# Patient Record
Sex: MALE | Race: WHITE | NOT HISPANIC OR LATINO | URBAN - METROPOLITAN AREA
[De-identification: names, ages, dates, MRNs, and addresses within clinical notes are randomized per-mention and may not be internally consistent; named-entity substitution may affect disease eponyms.]

---

## 2017-10-13 ENCOUNTER — APPOINTMENT (RX ONLY)
Dept: URBAN - METROPOLITAN AREA CLINIC 304 | Facility: CLINIC | Age: 57
Setting detail: DERMATOLOGY
End: 2017-10-13

## 2017-10-13 DIAGNOSIS — L30.9 DERMATITIS, UNSPECIFIED: ICD-10-CM

## 2017-10-13 DIAGNOSIS — I87.2 VENOUS INSUFFICIENCY (CHRONIC) (PERIPHERAL): ICD-10-CM

## 2017-10-13 PROBLEM — L40.0 PSORIASIS VULGARIS: Status: ACTIVE | Noted: 2017-10-13

## 2017-10-13 PROBLEM — L85.3 XEROSIS CUTIS: Status: ACTIVE | Noted: 2017-10-13

## 2017-10-13 PROBLEM — I83.12 VARICOSE VEINS OF LEFT LOWER EXTREMITY WITH INFLAMMATION: Status: ACTIVE | Noted: 2017-10-13

## 2017-10-13 PROBLEM — I10 ESSENTIAL (PRIMARY) HYPERTENSION: Status: ACTIVE | Noted: 2017-10-13

## 2017-10-13 PROBLEM — L55.1 SUNBURN OF SECOND DEGREE: Status: ACTIVE | Noted: 2017-10-13

## 2017-10-13 PROBLEM — L29.8 OTHER PRURITUS: Status: ACTIVE | Noted: 2017-10-13

## 2017-10-13 PROBLEM — I83.11 VARICOSE VEINS OF RIGHT LOWER EXTREMITY WITH INFLAMMATION: Status: ACTIVE | Noted: 2017-10-13

## 2017-10-13 PROCEDURE — 99202 OFFICE O/P NEW SF 15 MIN: CPT

## 2017-10-13 PROCEDURE — ? PRESCRIPTION

## 2017-10-13 PROCEDURE — ? COUNSELING

## 2017-10-13 RX ORDER — FLUOCINONIDE 0.5 MG/G
CREAM TOPICAL
Qty: 1 | Refills: 3 | Status: ERX | COMMUNITY
Start: 2017-10-13

## 2017-10-13 RX ADMIN — FLUOCINONIDE: 0.5 CREAM TOPICAL at 18:06

## 2017-10-13 ASSESSMENT — LOCATION DETAILED DESCRIPTION DERM
LOCATION DETAILED: LEFT DISTAL PRETIBIAL REGION
LOCATION DETAILED: LEFT PROXIMAL POSTERIOR THIGH
LOCATION DETAILED: LEFT ANTERIOR PROXIMAL THIGH
LOCATION DETAILED: LEFT BUTTOCK
LOCATION DETAILED: RIGHT DISTAL PRETIBIAL REGION

## 2017-10-13 ASSESSMENT — LOCATION ZONE DERM
LOCATION ZONE: TRUNK
LOCATION ZONE: LEG

## 2017-10-13 ASSESSMENT — LOCATION SIMPLE DESCRIPTION DERM
LOCATION SIMPLE: LEFT PRETIBIAL REGION
LOCATION SIMPLE: LEFT POSTERIOR THIGH
LOCATION SIMPLE: LEFT BUTTOCK
LOCATION SIMPLE: LEFT THIGH
LOCATION SIMPLE: RIGHT PRETIBIAL REGION

## 2017-10-13 NOTE — HPI: RASH
How Severe Is Your Rash?: moderate
Is This A New Presentation, Or A Follow-Up?: Rash
Additional History: Issue started after hip replacement surgery

## 2018-04-18 ENCOUNTER — APPOINTMENT (OUTPATIENT)
Dept: URBAN - METROPOLITAN AREA CLINIC 319 | Age: 58
Setting detail: DERMATOLOGY
End: 2018-04-18

## 2018-04-18 DIAGNOSIS — L20.89 OTHER ATOPIC DERMATITIS: ICD-10-CM

## 2018-04-18 DIAGNOSIS — L663 OTHER SPECIFIED DISEASES OF HAIR AND HAIR FOLLICLES: ICD-10-CM

## 2018-04-18 DIAGNOSIS — L738 OTHER SPECIFIED DISEASES OF HAIR AND HAIR FOLLICLES: ICD-10-CM

## 2018-04-18 PROBLEM — L20.84 INTRINSIC (ALLERGIC) ECZEMA: Status: ACTIVE | Noted: 2018-04-18

## 2018-04-18 PROBLEM — L02.425 FURUNCLE OF RIGHT LOWER LIMB: Status: ACTIVE | Noted: 2018-04-18

## 2018-04-18 PROBLEM — L02.426 FURUNCLE OF LEFT LOWER LIMB: Status: ACTIVE | Noted: 2018-04-18

## 2018-04-18 PROCEDURE — OTHER COUNSELING: OTHER

## 2018-04-18 PROCEDURE — 99213 OFFICE O/P EST LOW 20 MIN: CPT

## 2018-04-18 PROCEDURE — OTHER PRESCRIPTION: OTHER

## 2018-04-18 RX ORDER — HYDROXYZINE HYDROCHLORIDE 25 MG/1
TABLET, FILM COATED ORAL
Qty: 60 | Refills: 3 | Status: ERX | COMMUNITY
Start: 2018-04-18

## 2018-04-18 RX ORDER — TRIAMCINOLONE ACETONIDE 1 MG/G
CREAM TOPICAL BID
Qty: 1 | Refills: 2 | Status: ERX | COMMUNITY
Start: 2018-04-18

## 2018-04-18 RX ORDER — MINOCYCLINE HYDROCHLORIDE 50 MG/1
TABLET ORAL
Qty: 60 | Refills: 1 | Status: ERX | COMMUNITY
Start: 2018-04-18

## 2018-04-18 RX ORDER — CLOBETASOL PROPIONATE 0.5 MG/G
CREAM TOPICAL
Qty: 1 | Refills: 6 | Status: ERX

## 2018-04-18 ASSESSMENT — LOCATION DETAILED DESCRIPTION DERM
LOCATION DETAILED: RIGHT PROXIMAL PRETIBIAL REGION
LOCATION DETAILED: RIGHT DISTAL RADIAL DORSAL FOREARM
LOCATION DETAILED: LEFT MEDIAL UPPER BACK
LOCATION DETAILED: LEFT DISTAL ULNAR DORSAL FOREARM
LOCATION DETAILED: RIGHT DISTAL POSTERIOR THIGH
LOCATION DETAILED: LEFT DISTAL POSTERIOR THIGH
LOCATION DETAILED: LEFT PROXIMAL PRETIBIAL REGION

## 2018-04-18 ASSESSMENT — LOCATION ZONE DERM
LOCATION ZONE: LEG
LOCATION ZONE: TRUNK
LOCATION ZONE: ARM

## 2018-04-18 ASSESSMENT — LOCATION SIMPLE DESCRIPTION DERM
LOCATION SIMPLE: LEFT FOREARM
LOCATION SIMPLE: LEFT PRETIBIAL REGION
LOCATION SIMPLE: RIGHT FOREARM
LOCATION SIMPLE: RIGHT POSTERIOR THIGH
LOCATION SIMPLE: RIGHT PRETIBIAL REGION
LOCATION SIMPLE: LEFT UPPER BACK
LOCATION SIMPLE: LEFT POSTERIOR THIGH

## 2019-01-29 ENCOUNTER — APPOINTMENT (OUTPATIENT)
Dept: URBAN - METROPOLITAN AREA CLINIC 319 | Age: 59
Setting detail: DERMATOLOGY
End: 2019-01-29

## 2019-01-29 ENCOUNTER — RX ONLY (RX ONLY)
Age: 59
End: 2019-01-29

## 2019-01-29 DIAGNOSIS — L20.89 OTHER ATOPIC DERMATITIS: ICD-10-CM

## 2019-01-29 PROCEDURE — OTHER COUNSELING: OTHER

## 2019-01-29 PROCEDURE — 99213 OFFICE O/P EST LOW 20 MIN: CPT

## 2019-01-29 PROCEDURE — OTHER TREATMENT REGIMEN: OTHER

## 2019-01-29 RX ORDER — CLOBETASOL PROPIONATE 0.5 MG/G
CREAM TOPICAL
Qty: 1 | Refills: 6 | Status: ERX

## 2019-01-29 RX ORDER — TRIAMCINOLONE ACETONIDE 1 MG/G
CREAM TOPICAL BID
Qty: 1 | Refills: 2 | Status: ERX

## 2019-01-29 ASSESSMENT — LOCATION SIMPLE DESCRIPTION DERM
LOCATION SIMPLE: LOWER BACK
LOCATION SIMPLE: ABDOMEN
LOCATION SIMPLE: LEFT UPPER ARM
LOCATION SIMPLE: RIGHT ELBOW

## 2019-01-29 ASSESSMENT — LOCATION ZONE DERM
LOCATION ZONE: ARM
LOCATION ZONE: TRUNK

## 2019-01-29 ASSESSMENT — LOCATION DETAILED DESCRIPTION DERM
LOCATION DETAILED: LEFT ANTERIOR PROXIMAL UPPER ARM
LOCATION DETAILED: XIPHOID
LOCATION DETAILED: SUPERIOR LUMBAR SPINE
LOCATION DETAILED: RIGHT MEDIAL ANTECUBITAL SKIN

## 2019-01-29 NOTE — PROCEDURE: TREATMENT REGIMEN
Continue Regimen: Triamcinolone acetonide 0.1 % topical cream (Apply to irritated areas on body bid until clear) \\n\\nClobetasol 0.05 % topical cream (Apply to affected areas twice daily until clear)\\n\\nCerave moisturizing cream (apply twice daily)
Detail Level: Detailed
Plan: Once patient cleared he is to continue topicals for an additional week post clearing. he is to continue with cerave moisturizing cream for maintenance

## 2019-11-08 ENCOUNTER — APPOINTMENT (OUTPATIENT)
Dept: URBAN - METROPOLITAN AREA CLINIC 319 | Age: 59
Setting detail: DERMATOLOGY
End: 2019-11-08

## 2019-11-08 DIAGNOSIS — L30.9 DERMATITIS, UNSPECIFIED: ICD-10-CM

## 2019-11-08 DIAGNOSIS — L20.89 OTHER ATOPIC DERMATITIS: ICD-10-CM

## 2019-11-08 DIAGNOSIS — L29.8 OTHER PRURITUS: ICD-10-CM

## 2019-11-08 PROCEDURE — OTHER TREATMENT REGIMEN: OTHER

## 2019-11-08 PROCEDURE — OTHER PRESCRIPTION: OTHER

## 2019-11-08 PROCEDURE — OTHER COUNSELING: OTHER

## 2019-11-08 PROCEDURE — OTHER INJECTION: OTHER

## 2019-11-08 PROCEDURE — 96372 THER/PROPH/DIAG INJ SC/IM: CPT

## 2019-11-08 PROCEDURE — OTHER ADDITIONAL NOTES: OTHER

## 2019-11-08 PROCEDURE — 99214 OFFICE O/P EST MOD 30 MIN: CPT | Mod: 25

## 2019-11-08 RX ORDER — TRIAMCINOLONE ACETONIDE 1 MG/G
OINTMENT TOPICAL
Qty: 1 | Refills: 6 | Status: ERX | COMMUNITY
Start: 2019-11-08

## 2019-11-08 RX ORDER — FLUOCINONIDE 0.5 MG/ML
OINTMENT TOPICAL
Qty: 3 | Refills: 6 | Status: ERX | COMMUNITY
Start: 2019-11-08

## 2019-11-08 ASSESSMENT — BSA RASH: BSA RASH: 50

## 2019-11-08 ASSESSMENT — LOCATION DETAILED DESCRIPTION DERM
LOCATION DETAILED: RIGHT ANTERIOR DISTAL THIGH
LOCATION DETAILED: RIGHT INFERIOR LATERAL LOWER BACK
LOCATION DETAILED: RIGHT PROXIMAL PRETIBIAL REGION
LOCATION DETAILED: LEFT ANTERIOR DISTAL THIGH
LOCATION DETAILED: RIGHT PROXIMAL PRETIBIAL REGION
LOCATION DETAILED: LEFT DISTAL PRETIBIAL REGION
LOCATION DETAILED: LEFT PROXIMAL PRETIBIAL REGION
LOCATION DETAILED: LEFT PROXIMAL PRETIBIAL REGION

## 2019-11-08 ASSESSMENT — LOCATION SIMPLE DESCRIPTION DERM
LOCATION SIMPLE: RIGHT PRETIBIAL REGION
LOCATION SIMPLE: LEFT PRETIBIAL REGION
LOCATION SIMPLE: RIGHT LOWER BACK
LOCATION SIMPLE: LEFT PRETIBIAL REGION
LOCATION SIMPLE: RIGHT PRETIBIAL REGION
LOCATION SIMPLE: RIGHT THIGH
LOCATION SIMPLE: LEFT THIGH

## 2019-11-08 ASSESSMENT — LOCATION ZONE DERM
LOCATION ZONE: LEG
LOCATION ZONE: LEG
LOCATION ZONE: TRUNK

## 2019-11-08 ASSESSMENT — SEVERITY ASSESSMENT: SEVERITY: SEVERE

## 2019-11-08 ASSESSMENT — BSA ECZEMA: % BODY COVERED IN ECZEMA: 50

## 2019-11-08 NOTE — HPI: RASH
How Severe Is Your Rash?: mild
Is This A New Presentation, Or A Follow-Up?: Rash
Additional History: Patient states he is here for a rash after wound care

## 2019-11-08 NOTE — HPI: WOUND
Is The Wound New Or Recurrent?: new
How Severe Is It?: moderate
How Is Your Wound Healing?: not healing
Is This A New Presentation, Or A Follow-Up?: Wound
Additional History: Patient had wound cloture and stated they told him it was a staph infection

## 2019-11-08 NOTE — PROCEDURE: TREATMENT REGIMEN
Detail Level: Detailed
Plan: Apply fluocinonide twice daily and triamcinolone ointment twice daily alternating with fluocinonide.
Plan: See plan for atopic dermatitis

## 2019-11-08 NOTE — PROCEDURE: ADDITIONAL NOTES
Detail Level: Simple
Additional Notes: Investigator's Global Assessment: 4\\n\\nPatient reports that the constant pruritis is causing lack of sleep at night and subsequently decreased quality of life.

## 2019-11-13 ENCOUNTER — APPOINTMENT (OUTPATIENT)
Dept: URBAN - METROPOLITAN AREA CLINIC 319 | Age: 59
Setting detail: DERMATOLOGY
End: 2019-11-13

## 2019-11-13 DIAGNOSIS — L20.89 OTHER ATOPIC DERMATITIS: ICD-10-CM

## 2019-11-13 DIAGNOSIS — L30.9 DERMATITIS, UNSPECIFIED: ICD-10-CM

## 2019-11-13 DIAGNOSIS — L29.8 OTHER PRURITUS: ICD-10-CM

## 2019-11-13 PROCEDURE — OTHER ADDITIONAL NOTES: OTHER

## 2019-11-13 PROCEDURE — OTHER PRESCRIPTION: OTHER

## 2019-11-13 PROCEDURE — 99214 OFFICE O/P EST MOD 30 MIN: CPT

## 2019-11-13 PROCEDURE — OTHER TREATMENT REGIMEN: OTHER

## 2019-11-13 PROCEDURE — OTHER COUNSELING: OTHER

## 2019-11-13 RX ORDER — HYDROXYZINE HYDROCHLORIDE 10 MG/1
TABLET, FILM COATED ORAL
Qty: 60 | Refills: 2 | Status: ERX | COMMUNITY
Start: 2019-11-13

## 2019-11-13 ASSESSMENT — LOCATION SIMPLE DESCRIPTION DERM
LOCATION SIMPLE: RIGHT PRETIBIAL REGION
LOCATION SIMPLE: RIGHT PRETIBIAL REGION
LOCATION SIMPLE: LEFT PRETIBIAL REGION
LOCATION SIMPLE: LEFT PRETIBIAL REGION
LOCATION SIMPLE: LEFT THIGH
LOCATION SIMPLE: RIGHT THIGH

## 2019-11-13 ASSESSMENT — LOCATION DETAILED DESCRIPTION DERM
LOCATION DETAILED: RIGHT PROXIMAL PRETIBIAL REGION
LOCATION DETAILED: LEFT ANTERIOR DISTAL THIGH
LOCATION DETAILED: LEFT PROXIMAL PRETIBIAL REGION
LOCATION DETAILED: LEFT PROXIMAL PRETIBIAL REGION
LOCATION DETAILED: RIGHT PROXIMAL PRETIBIAL REGION
LOCATION DETAILED: RIGHT ANTERIOR DISTAL THIGH
LOCATION DETAILED: LEFT DISTAL PRETIBIAL REGION

## 2019-11-13 ASSESSMENT — LOCATION ZONE DERM
LOCATION ZONE: LEG
LOCATION ZONE: LEG

## 2019-11-13 ASSESSMENT — BSA ECZEMA: % BODY COVERED IN ECZEMA: 40

## 2019-11-13 ASSESSMENT — SEVERITY ASSESSMENT: SEVERITY: MODERATE

## 2019-11-13 NOTE — PROCEDURE: TREATMENT REGIMEN
Initiate Treatment: Clobetasol twice daily to arms, and legs.
Plan: See plan for atopic dermatitis
Otc Regimen: Cerave itch relief cream.
Plan: Apply fluocinonide twice daily and triamcinolone ointment twice daily alternating with fluocinonide.
Continue Regimen: Triamcinolone ointment twice daily alternating with fluocininide ointment.
Detail Level: Detailed

## 2019-12-03 ENCOUNTER — APPOINTMENT (OUTPATIENT)
Dept: URBAN - METROPOLITAN AREA CLINIC 319 | Age: 59
Setting detail: DERMATOLOGY
End: 2019-12-03

## 2019-12-03 DIAGNOSIS — L20.89 OTHER ATOPIC DERMATITIS: ICD-10-CM

## 2019-12-03 PROCEDURE — OTHER TREATMENT REGIMEN: OTHER

## 2019-12-03 PROCEDURE — OTHER COUNSELING: OTHER

## 2019-12-03 PROCEDURE — 99214 OFFICE O/P EST MOD 30 MIN: CPT

## 2019-12-03 ASSESSMENT — LOCATION DETAILED DESCRIPTION DERM
LOCATION DETAILED: LEFT PROXIMAL PRETIBIAL REGION
LOCATION DETAILED: RIGHT PROXIMAL PRETIBIAL REGION
LOCATION DETAILED: LEFT ANTERIOR DISTAL THIGH
LOCATION DETAILED: RIGHT ANTERIOR DISTAL THIGH

## 2019-12-03 ASSESSMENT — LOCATION SIMPLE DESCRIPTION DERM
LOCATION SIMPLE: RIGHT PRETIBIAL REGION
LOCATION SIMPLE: LEFT PRETIBIAL REGION
LOCATION SIMPLE: RIGHT THIGH
LOCATION SIMPLE: LEFT THIGH

## 2019-12-03 ASSESSMENT — LOCATION ZONE DERM: LOCATION ZONE: LEG

## 2019-12-03 ASSESSMENT — SEVERITY ASSESSMENT: SEVERITY: MILD

## 2019-12-03 NOTE — PROCEDURE: TREATMENT REGIMEN
Discontinue Regimen: Fluocinonide unless flaring
Plan: If patient is doing well one once daily for a month then we can decrease to three times a week then twice a week then once weekly then stop as long as there is no flare.
Otc Regimen: Cerave itch relief cream.
Continue Regimen: Triamcinolone ointment once daily\\nHydroxyzine as needed for itching\\nClobetasol twice daily to arms, and legs
Detail Level: Detailed

## 2022-07-26 ENCOUNTER — APPOINTMENT (OUTPATIENT)
Dept: URBAN - METROPOLITAN AREA CLINIC 319 | Age: 62
Setting detail: DERMATOLOGY
End: 2022-07-31

## 2022-07-26 DIAGNOSIS — L20.89 OTHER ATOPIC DERMATITIS: ICD-10-CM

## 2022-07-26 PROCEDURE — OTHER PRESCRIPTION: OTHER

## 2022-07-26 PROCEDURE — 96372 THER/PROPH/DIAG INJ SC/IM: CPT

## 2022-07-26 PROCEDURE — OTHER COUNSELING: OTHER

## 2022-07-26 PROCEDURE — OTHER PRESCRIPTION MEDICATION MANAGEMENT: OTHER

## 2022-07-26 PROCEDURE — OTHER INTRAMUSCULAR KENALOG: OTHER

## 2022-07-26 RX ORDER — CLOBETASOL PROPIONATE 0.5 MG/G
OINTMENT TOPICAL
Qty: 60 | Refills: 3 | Status: ERX | COMMUNITY
Start: 2022-07-26

## 2022-07-26 RX ORDER — DOXYCYCLINE HYCLATE 100 MG/1
CAPSULE, GELATIN COATED ORAL
Qty: 28 | Refills: 0 | Status: ERX | COMMUNITY
Start: 2022-07-26

## 2022-07-26 ASSESSMENT — LOCATION SIMPLE DESCRIPTION DERM
LOCATION SIMPLE: RIGHT HAND
LOCATION SIMPLE: RIGHT BUTTOCK
LOCATION SIMPLE: RIGHT SCALP
LOCATION SIMPLE: LEFT HAND
LOCATION SIMPLE: LEFT PRETIBIAL REGION
LOCATION SIMPLE: RIGHT PRETIBIAL REGION
LOCATION SIMPLE: SCALP

## 2022-07-26 ASSESSMENT — LOCATION ZONE DERM
LOCATION ZONE: TRUNK
LOCATION ZONE: HAND
LOCATION ZONE: SCALP
LOCATION ZONE: LEG

## 2022-07-26 ASSESSMENT — LOCATION DETAILED DESCRIPTION DERM
LOCATION DETAILED: RIGHT SUPERIOR PARIETAL SCALP
LOCATION DETAILED: LEFT CENTRAL PARIETAL SCALP
LOCATION DETAILED: LEFT RADIAL PALM
LOCATION DETAILED: RIGHT MEDIAL FRONTAL SCALP
LOCATION DETAILED: LEFT DISTAL PRETIBIAL REGION
LOCATION DETAILED: RIGHT BUTTOCK
LOCATION DETAILED: RIGHT DISTAL PRETIBIAL REGION
LOCATION DETAILED: RIGHT RADIAL PALM

## 2022-07-26 ASSESSMENT — BSA ECZEMA: % BODY COVERED IN ECZEMA: 45

## 2022-07-26 NOTE — PROCEDURE: INTRAMUSCULAR KENALOG
Consent: The risks of atrophy were reviewed with the patient.
Concentration (Mg/Ml) Of Additional Medication: 2.5
Administered By (Optional): Diya
Concentration (Mg/Ml): 40.0
Add Option For Additional Mediation: No
Total Volume (Ccs): 1.5
Detail Level: None
Kenalog Preparation: kenalog

## 2022-07-26 NOTE — PROCEDURE: PRESCRIPTION MEDICATION MANAGEMENT
Detail Level: Zone
Render In Strict Bullet Format?: No
Plan: If not improved at next appt we will consider dupixent or renvogue
Initiate Treatment: clobetasol 0.05 % topical ointment : Apply thin layer to affected areas twice daily until clear. No longer then 4 weeks\\n\\ndoxycycline hyclate 100 mg capsule : Taken 1 capsule by mouth twice daily for 2 weeks

## 2022-09-06 ENCOUNTER — APPOINTMENT (OUTPATIENT)
Dept: URBAN - METROPOLITAN AREA CLINIC 319 | Age: 62
Setting detail: DERMATOLOGY
End: 2022-09-08

## 2022-09-06 DIAGNOSIS — L20.89 OTHER ATOPIC DERMATITIS: ICD-10-CM

## 2022-09-06 PROCEDURE — OTHER COUNSELING: OTHER

## 2022-09-06 PROCEDURE — OTHER TREATMENT REGIMEN: OTHER

## 2022-09-06 PROCEDURE — OTHER PRESCRIPTION: OTHER

## 2022-09-06 PROCEDURE — OTHER PRESCRIPTION MEDICATION MANAGEMENT: OTHER

## 2022-09-06 PROCEDURE — 99213 OFFICE O/P EST LOW 20 MIN: CPT

## 2022-09-06 RX ORDER — CLOBETASOL PROPIONATE 0.5 MG/G
OINTMENT TOPICAL
Qty: 45 | Refills: 3 | Status: ERX

## 2022-09-06 RX ORDER — TRIAMCINOLONE ACETONIDE 1 MG/G
CREAM TOPICAL BID
Qty: 80 | Refills: 3 | Status: ERX | COMMUNITY
Start: 2022-09-06

## 2022-09-06 ASSESSMENT — LOCATION SIMPLE DESCRIPTION DERM
LOCATION SIMPLE: SCALP
LOCATION SIMPLE: LEFT HAND
LOCATION SIMPLE: ANTERIOR SCALP
LOCATION SIMPLE: RIGHT HAND
LOCATION SIMPLE: RIGHT PRETIBIAL REGION
LOCATION SIMPLE: LEFT PRETIBIAL REGION

## 2022-09-06 ASSESSMENT — LOCATION DETAILED DESCRIPTION DERM
LOCATION DETAILED: RIGHT SUPERIOR PARIETAL SCALP
LOCATION DETAILED: LEFT RADIAL PALM
LOCATION DETAILED: RIGHT ULNAR PALM
LOCATION DETAILED: MID-FRONTAL SCALP
LOCATION DETAILED: LEFT CENTRAL PARIETAL SCALP
LOCATION DETAILED: RIGHT DISTAL PRETIBIAL REGION
LOCATION DETAILED: LEFT DISTAL PRETIBIAL REGION

## 2022-09-06 ASSESSMENT — LOCATION ZONE DERM
LOCATION ZONE: LEG
LOCATION ZONE: SCALP
LOCATION ZONE: HAND

## 2022-09-06 ASSESSMENT — SEVERITY ASSESSMENT 2020: SEVERITY 2020: ALMOST CLEAR

## 2022-09-06 ASSESSMENT — BSA ECZEMA: % BODY COVERED IN ECZEMA: 5

## 2022-09-06 NOTE — PROCEDURE: TREATMENT REGIMEN
Detail Level: Detailed
Continue Regimen: Clobetasol 0.05 % topical ointment \\nApply thin layer to affected areas twice daily until clear. No longer then 4 week
Otc Regimen: CeraVe moisturizing cream (use daily)